# Patient Record
Sex: FEMALE | Race: WHITE
[De-identification: names, ages, dates, MRNs, and addresses within clinical notes are randomized per-mention and may not be internally consistent; named-entity substitution may affect disease eponyms.]

---

## 2018-01-23 ENCOUNTER — HOSPITAL ENCOUNTER (OUTPATIENT)
Dept: HOSPITAL 39 - GMAJ | Age: 68
Discharge: HOME | End: 2018-01-23
Attending: FAMILY MEDICINE
Payer: MEDICARE

## 2018-01-23 DIAGNOSIS — I10: Primary | ICD-10-CM

## 2018-03-23 ENCOUNTER — HOSPITAL ENCOUNTER (OUTPATIENT)
Dept: HOSPITAL 39 - US | Age: 68
End: 2018-03-23
Attending: FAMILY MEDICINE
Payer: MEDICARE

## 2018-03-23 DIAGNOSIS — E04.1: Primary | ICD-10-CM

## 2018-03-25 NOTE — US
EXAM DESCRIPTION:

Soft Tissue,Head/Neck



CLINICAL HISTORY:

67 years Female, THYROID NODULE



COMPARISON:

None.



FINDINGS:

The right thyroid lobe measures 5.4 cm in length. It contains

multiple solid or mixed solid and cystic nodules, the largest

measuring up to 1.5 cm diameter. The thyroid isthmus is not

thickened. The left thyroid lobe measures 4.9 cm in length. It

contains several cystic and cystic/solid nodules, the largest

measuring 7 or 8 mm diameter.



IMPRESSION:

Bilateral thyroid nodules as detailed above, largest measuring

1.5 cm diameter in the right thyroid lobe. Ultrasound-guided

biopsy of this largest nodule should be considered. At a minimum,

short-term follow-up ultrasound in 3-6 months is recommended to

document stability.



Electronically signed by:  Tee Chaney MD  3/25/2018 5:43 PM CDT

Workstation: [id number]

## 2020-05-28 ENCOUNTER — HOSPITAL ENCOUNTER (OUTPATIENT)
Dept: HOSPITAL 39 - MAMMO | Age: 70
End: 2020-05-28
Attending: FAMILY MEDICINE
Payer: MEDICARE

## 2020-05-28 DIAGNOSIS — Z12.31: Primary | ICD-10-CM

## 2020-05-29 ENCOUNTER — HOSPITAL ENCOUNTER (OUTPATIENT)
Dept: HOSPITAL 39 - AMB | Age: 70
Discharge: HOME | End: 2020-05-29
Attending: SPECIALIST
Payer: MEDICARE

## 2020-05-29 VITALS — SYSTOLIC BLOOD PRESSURE: 141 MMHG | TEMPERATURE: 97.3 F | DIASTOLIC BLOOD PRESSURE: 55 MMHG

## 2020-05-29 VITALS — OXYGEN SATURATION: 100 %

## 2020-05-29 DIAGNOSIS — Z86.010: ICD-10-CM

## 2020-05-29 DIAGNOSIS — M81.0: ICD-10-CM

## 2020-05-29 DIAGNOSIS — Z12.11: Primary | ICD-10-CM

## 2020-05-29 DIAGNOSIS — D12.3: ICD-10-CM

## 2020-05-29 DIAGNOSIS — K62.1: ICD-10-CM

## 2020-05-29 DIAGNOSIS — Z79.899: ICD-10-CM

## 2020-05-29 DIAGNOSIS — I10: ICD-10-CM

## 2020-05-29 DIAGNOSIS — F32.9: ICD-10-CM

## 2020-05-29 DIAGNOSIS — D12.9: ICD-10-CM

## 2020-05-29 DIAGNOSIS — D12.6: ICD-10-CM

## 2020-05-29 PROCEDURE — 88305 TISSUE EXAM BY PATHOLOGIST: CPT

## 2020-05-29 PROCEDURE — 00812 ANES LWR INTST SCR COLSC: CPT

## 2020-05-29 PROCEDURE — 45380 COLONOSCOPY AND BIOPSY: CPT

## 2020-05-29 NOTE — OP
DATE OF PROCEDURE:  05/29/20



PREOPERATIVE DIAGNOSIS: 

1.  Screening colonoscopy.



POSTOPERATIVE DIAGNOSIS: 

1.  Colonic polyps.



PROCEDURE: 

1.   Colonoscopy with forceps biopsy excision x4.

   a.  Small, flat polyp in distal transverse colon.

   b.  Flat polyp at approximately 20 cm.

   c.  Rectal polyp at 10 cm, 2.5 mm.

   d.  Anal polyp, 3 mm.



SURGEON:  Jose Luis Bob MD



ANESTHESIA: General.



PROCEDURE:  General anesthesia was induced.  Digital rectal exam was normal.  
The colonoscope was then passed.  There was minor looping in this very thin 
patient.  This was taken care of with gentle pressure.  We ultimately reached 
the cecum identified by the appendiceal orifice and ileocecal valve.  Upon 
withdrawal, the cecum and ascending colon appeared normal and transverse colon 
was normal until the distal transverse colon we identified a flat appearing 
polyp, certainly not easy to see, but recognized, so it was excised.  Upon 
further withdrawal, at 20 cm was a similar appearing flat polyp that was also 
excised.  At 10 cm in the upper rectum, a more traditional polyp was seen, about
2.5 mm.  This was completely excised.  On retroflexion, a polyp about 1.5 cm in,
possibly 2 cm, was excised as well.  It was over 2.5 mm.  Minor oozing from 
that, but no bleeding.  The patient tolerated the procedure.  We had desufflated
the abdomen on withdrawal.  She was awakened and taken to Recovery to be 
discharged.  



#[id number]



cc:  Jose Luis Ayala MD

[facility]

## 2020-06-01 NOTE — MAM
EXAM DESCRIPTION: 

3D Screening BILATERAL : Digital Mammography.



CLINICAL HISTORY: 

69 years Female SCREEN . No complaints. No personal or family

history of breast cancer. Menarche age 13. Childbirth age 23.

Menopause age 47. HRT 5 or more years ago. Benign right breast

biopsy.. Lifetime risk of developing breast cancer (Tyrer-Cuzick

model)(%):  5.2.



COMPARISON: 

Baseline study at this facility.  No prior reports available.  



TECHNIQUE: 

Bilateral CC and MLO projection full-field images, digital

tomosynthesis mammographic technique. Bilateral digital 2-D

full-field MLO images.  CAD available for 2-D images.  



FINDINGS: 

The breast parenchymal density pattern is: Heterogeneously dense

breast tissue, which may obscure small masses. No skin thickening

or nipple retraction.  Biopsy site marker upper outer quadrant

posterior to middle third right breast. Solitary

microcalcifications. Bilateral regions of focal asymmetry versus

mass density: Right breast 10:00-11:00, 4 cm from the nipple.

Left axilla 9:00 position abutting the chest wall. Not associated

with microcalcifications.



IMPRESSION: 

BI-RADS CATEGORY: 0 - INCOMPLETE- Need additional imaging

evaluation.



RECOMMENDATIONS:

FOLLOW-UP: Recall for additional imaging: Bilateral full-field

2-D and tomosynthesis images LM projection. Directed bilateral

breast ultrasound of the regions of interest..



Written communication concerning the IMPRESSION and Follow-up,

will be mailed to the patient and referring health care provider.



Electronically signed by:  Henry Barrett MD  6/1/2020 2:20 PM CDT

Workstation: [id number]

## 2020-06-15 ENCOUNTER — HOSPITAL ENCOUNTER (OUTPATIENT)
Dept: HOSPITAL 39 - MAMMO | Age: 70
End: 2020-06-15
Attending: FAMILY MEDICINE
Payer: MEDICARE

## 2020-06-15 DIAGNOSIS — R92.8: Primary | ICD-10-CM

## 2020-06-15 PROCEDURE — 77066 DX MAMMO INCL CAD BI: CPT

## 2020-06-15 PROCEDURE — 76641 ULTRASOUND BREAST COMPLETE: CPT

## 2020-06-15 NOTE — US
EXAM DESCRIPTION: 

3D Diagnostic, Bilateral (accession [id number]) digital

mammography. Breast,Bilateral (accession [id number]):

Ultrasound



CLINICAL HISTORY: 

69 yearsFemaleABNORMAL MAMMOGRAM . Focal asymmetry bilateral

breast. Lifetime risk of developing breast cancer (Tyrer-Cuzick

model)(%):  Not calculated



COMPARISON: 

Other



TECHNIQUE: 

Bilateral LM projection full-field images, digital tomosynthesis

technique. Bilateral 2-D digital full-field images:  LM

projection. CAD available for 2-D images.. Transcutaneous

scanning of the bilateral breasts utilizing gray-scale and

Doppler modes. Scanning performed by the sonographer ; remote

monitoring by Dr. Barrett.



FINDINGS: 

The breast parenchymal density pattern is: Heterogeneously dense

breast tissue, which may obscure small masses. No skin thickening

or nipple retraction  focal asymmetry posterior left breast 6 cm

from the nipple at 2:30-3:00. Focal asymmetry right breast 3 to 4

cm from the nipple 11:00-12:00. No focal asymmetry seen on the

right.

No suspicious microcalcifications bilaterally. 



Ultrasound: Scanning anterior upper outer quadrant right breast.

Mostly dense fibroglandular tissue. Minimal fatty tissue.

Specifically 4 cm from the nipple at 9:00: Multiple foci of

fibroglandular tissues. No dominant solid mass, no distinct cyst,

no fluid collection, no large calcifications. Scanning lateral

left breast mid and posterior. Mixture of fatty and

fibroglandular tissues. Scanning specifically 6 cm from the

nipple at 3:00. Within the fibroglandular tissue is a hypoechoic

region which has margins. Partially circumscribed. Dimensions are

9.3 x 6.5 mm. Not vascular. Possible fluid component. Wider than

tall orientation. Mostly mild posterior shadowing. Complicated

cyst versus fibroadenoma versus a reactive lymph node.



IMPRESSION: 

BI-RADS CATEGORY: 3 - PROBABLY BENIGN.



RECOMMENDATIONS:

FOLLOW-UP: Short interval (6-month) diagnostic left breast

digital tomosynthesis with directed left breast ultrasound..



The FINDINGS and the FOLLOW-UP plan were reviewed in person with

the patient after the examination. Written communication

explaining the IMPRESSION and FOLLOW-UP will be mailed to the

patient and referring care provider.



Electronically signed by:  Henry Barrett MD  6/15/2020 5:29 PM CDT

Workstation: [id number]

## 2020-06-15 NOTE — MAM
EXAM DESCRIPTION: 

3D Diagnostic, Bilateral (accession [id number]) digital

mammography. Breast,Bilateral (accession [id number]):

Ultrasound



CLINICAL HISTORY: 

69 yearsFemaleABNORMAL MAMMOGRAM . Focal asymmetry bilateral

breast. Lifetime risk of developing breast cancer (Tyrer-Cuzick

model)(%):  Not calculated



COMPARISON: 

Other



TECHNIQUE: 

Bilateral LM projection full-field images, digital tomosynthesis

technique. Bilateral 2-D digital full-field images:  LM

projection. CAD available for 2-D images.. Transcutaneous

scanning of the bilateral breasts utilizing gray-scale and

Doppler modes. Scanning performed by the sonographer ; remote

monitoring by Dr. Barrett.



FINDINGS: 

The breast parenchymal density pattern is: Heterogeneously dense

breast tissue, which may obscure small masses. No skin thickening

or nipple retraction  focal asymmetry posterior left breast 6 cm

from the nipple at 2:30-3:00. Focal asymmetry right breast 3 to 4

cm from the nipple 11:00-12:00. No focal asymmetry seen on the

right.

No suspicious microcalcifications bilaterally. 



Ultrasound: Scanning anterior upper outer quadrant right breast.

Mostly dense fibroglandular tissue. Minimal fatty tissue.

Specifically 4 cm from the nipple at 9:00: Multiple foci of

fibroglandular tissues. No dominant solid mass, no distinct cyst,

no fluid collection, no large calcifications. Scanning lateral

left breast mid and posterior. Mixture of fatty and

fibroglandular tissues. Scanning specifically 6 cm from the

nipple at 3:00. Within the fibroglandular tissue is a hypoechoic

region which has margins. Partially circumscribed. Dimensions are

9.3 x 6.5 mm. Not vascular. Possible fluid component. Wider than

tall orientation. Mostly mild posterior shadowing. Complicated

cyst versus fibroadenoma versus a reactive lymph node.



IMPRESSION: 

BI-RADS CATEGORY: 3 - PROBABLY BENIGN.



RECOMMENDATIONS:

FOLLOW-UP: Short interval (6-month) diagnostic left breast

digital tomosynthesis with directed left breast ultrasound..



The FINDINGS and the FOLLOW-UP plan were reviewed in person with

the patient after the examination. Written communication

explaining the IMPRESSION and FOLLOW-UP will be mailed to the

patient and referring care provider.



Electronically signed by:  Henry Barrett MD  6/15/2020 5:29 PM CDT

Workstation: [id number]

## 2020-07-01 ENCOUNTER — HOSPITAL ENCOUNTER (OUTPATIENT)
Dept: HOSPITAL 39 - GMAJ | Age: 70
End: 2020-07-01
Attending: FAMILY MEDICINE
Payer: MEDICARE

## 2020-07-01 DIAGNOSIS — I10: Primary | ICD-10-CM
